# Patient Record
Sex: FEMALE | Race: WHITE
[De-identification: names, ages, dates, MRNs, and addresses within clinical notes are randomized per-mention and may not be internally consistent; named-entity substitution may affect disease eponyms.]

---

## 2019-12-13 ENCOUNTER — HOSPITAL ENCOUNTER (OUTPATIENT)
Dept: HOSPITAL 39 - MAMMO | Age: 44
End: 2019-12-13
Attending: FAMILY MEDICINE
Payer: COMMERCIAL

## 2019-12-13 DIAGNOSIS — Z12.31: Primary | ICD-10-CM

## 2020-01-02 ENCOUNTER — HOSPITAL ENCOUNTER (OUTPATIENT)
Dept: HOSPITAL 39 - MAMMO | Age: 45
End: 2020-01-02
Attending: FAMILY MEDICINE
Payer: COMMERCIAL

## 2020-01-02 DIAGNOSIS — N60.01: Primary | ICD-10-CM

## 2020-01-02 DIAGNOSIS — N60.02: ICD-10-CM

## 2020-01-02 PROCEDURE — 77066 DX MAMMO INCL CAD BI: CPT

## 2020-01-02 PROCEDURE — 76641 ULTRASOUND BREAST COMPLETE: CPT

## 2020-01-02 NOTE — US
EXAM DESCRIPTION: 

3D Diagnostic, Bilateral (accession [id number]),

Breast,Bilateral (accession [id number]): Ultrasound



CLINICAL HISTORY: 

44 yearsFemaleABNORMAL MAMMO bilateral breast masses on

screening. Lifetime risk of developing breast cancer

(Tyrer-Cuzick model)(%):  8.7.



COMPARISON: 

Bilateral screening digital breast tomosynthesis 13 December 2019.



TECHNIQUE: 

Bilateral LM projection full-field images, digital tomosynthesis

technique, with Gabby implant displacement. Bilateral 2-D

digital full-field images:  LM projection with no implant

displacement. CAD not available.. Transcutaneous scanning of the

bilateral breasts utilizing gray-scale and Doppler modes.

Scanning performed by the sonographer ; observation by Dr. Hassan.



FINDINGS: 

The breast parenchymal density pattern is: Heterogeneously dense

breast tissue, which may obscure small masses. Extremely dense

breast tissue, which lowers the sensitivity of mammography. No

skin thickening or nipple retraction . Left breast mass density

at the 8:00-9:00 position posterior lower inner quadrant left

breast abutting the implant capsule. No associated

microcalcifications. Smaller mass density in the upper outer

quadrant of the left breast middle third at the 2:00-2:30

position. No associated microcalcifications mass density in the

upper inner quadrant of the middle third of the right breast

approximately 2:00-3:00 position. Mass density posterior third of

the right breast 5:00-6:00 position. Not associated with

microcalcifications. Bilateral retro-muscular implants..



Ultrasound: Scanning of the lateral medial and upper outer

quadrant of the right breast. Mixture of fibroglandular tissues

and fatty tissues. Circumscribed object with echogenic margins,

wider than tall orientation, predominantly anechoic features, and

posterior acoustic and enhancement are visualized. One object

measures 4 x 4 x 3 mm. Another object measures 5.4 x 7.6 x 5.3

mm. These are nonvascular and are most likely cysts. Bilobed

object measures 9.6 x 9.0 x 2.2 mm but may contain internal

echoes and vascularity suggesting a lymph node. No dominant solid

mass and no large calcifications. Capsule appears intact where

seen.

Scanning of the left breast demonstrates a mixture of

fibroglandular and fatty tissues. At the 8:00 position in the

posterior third of the breast is a 10.1 x 11.2 x 7.7 mm anechoic

structure with circumscribed minimally lobulated margins, wider

than tall orientation, and no vascularity and posterior acoustic

enhancement consistent with a cyst. At the 2:00 position

posteriorly, is a anechoic structure measuring 4 x 4 by 3 mm with

no internal vascularity, circumscribed margins partially

echogenic, and posterior acoustic enhancement consistent with a

cyst.



IMPRESSION: 

Benign exam. Bilateral cysts and lymph nodes. Implant capsules

appear intact where seen.



BIRAD CATEGORY: 2 BENIGN FINDINGS.



RECOMMENDATIONS:

FOLLOW UP: Return to routine digital bilateral  mammographic

screening, one year interval from  December 2019.



Written communication explaining the IMPRESSION and follow-up,

will be mailed to the patient and referring health care provider.

The FINDINGS  and the FOLLOW-UP plan were reviewed in person with

the patient after the examination.



According to the [employer], yearly mammograms

are recommended starting at age 40 and continuing as long as a

woman is in good health.  Any breast change noted on a breast

self-exam should be reported promptly to the patient's healthcare

provider.  Breast MRI is recommended for women with an

approximately 20-25% or greater lifetime risk of breast cancer,

including women with a strong family history of breast or ovarian

cancer and women who have been treated for Hodgkin's disease.



A negative mammographic report should not delay tissue diagnosis

in patients with significant clinical history or physical

findings.



Extremely dense breast tissue limits the sensitivity of digital

mammography. 



Electronically signed by:  Roman Hassan MD  1/2/2020 11:33 AM CST

Workstation: [id number]

## 2020-01-02 NOTE — MAM
EXAM DESCRIPTION: 

3D Diagnostic, Bilateral (accession [id number]),

Breast,Bilateral (accession [id number]): Ultrasound



CLINICAL HISTORY: 

44 yearsFemaleABNORMAL MAMMO bilateral breast masses on

screening. Lifetime risk of developing breast cancer

(Tyrer-Cuzick model)(%):  8.7.



COMPARISON: 

Bilateral screening digital breast tomosynthesis 13 December 2019.



TECHNIQUE: 

Bilateral LM projection full-field images, digital tomosynthesis

technique, with Gabby implant displacement. Bilateral 2-D

digital full-field images:  LM projection with no implant

displacement. CAD not available.. Transcutaneous scanning of the

bilateral breasts utilizing gray-scale and Doppler modes.

Scanning performed by the sonographer ; observation by Dr. Hassan.



FINDINGS: 

The breast parenchymal density pattern is: Heterogeneously dense

breast tissue, which may obscure small masses. Extremely dense

breast tissue, which lowers the sensitivity of mammography. No

skin thickening or nipple retraction . Left breast mass density

at the 8:00-9:00 position posterior lower inner quadrant left

breast abutting the implant capsule. No associated

microcalcifications. Smaller mass density in the upper outer

quadrant of the left breast middle third at the 2:00-2:30

position. No associated microcalcifications mass density in the

upper inner quadrant of the middle third of the right breast

approximately 2:00-3:00 position. Mass density posterior third of

the right breast 5:00-6:00 position. Not associated with

microcalcifications. Bilateral retro-muscular implants..



Ultrasound: Scanning of the lateral medial and upper outer

quadrant of the right breast. Mixture of fibroglandular tissues

and fatty tissues. Circumscribed object with echogenic margins,

wider than tall orientation, predominantly anechoic features, and

posterior acoustic and enhancement are visualized. One object

measures 4 x 4 x 3 mm. Another object measures 5.4 x 7.6 x 5.3

mm. These are nonvascular and are most likely cysts. Bilobed

object measures 9.6 x 9.0 x 2.2 mm but may contain internal

echoes and vascularity suggesting a lymph node. No dominant solid

mass and no large calcifications. Capsule appears intact where

seen.

Scanning of the left breast demonstrates a mixture of

fibroglandular and fatty tissues. At the 8:00 position in the

posterior third of the breast is a 10.1 x 11.2 x 7.7 mm anechoic

structure with circumscribed minimally lobulated margins, wider

than tall orientation, and no vascularity and posterior acoustic

enhancement consistent with a cyst. At the 2:00 position

posteriorly, is a anechoic structure measuring 4 x 4 by 3 mm with

no internal vascularity, circumscribed margins partially

echogenic, and posterior acoustic enhancement consistent with a

cyst.



IMPRESSION: 

Benign exam. Bilateral cysts and lymph nodes. Implant capsules

appear intact where seen.



BIRAD CATEGORY: 2 BENIGN FINDINGS.



RECOMMENDATIONS:

FOLLOW UP: Return to routine digital bilateral  mammographic

screening, one year interval from  December 2019.



Written communication explaining the IMPRESSION and follow-up,

will be mailed to the patient and referring health care provider.

The FINDINGS  and the FOLLOW-UP plan were reviewed in person with

the patient after the examination.



According to the [employer], yearly mammograms

are recommended starting at age 40 and continuing as long as a

woman is in good health.  Any breast change noted on a breast

self-exam should be reported promptly to the patient's healthcare

provider.  Breast MRI is recommended for women with an

approximately 20-25% or greater lifetime risk of breast cancer,

including women with a strong family history of breast or ovarian

cancer and women who have been treated for Hodgkin's disease.



A negative mammographic report should not delay tissue diagnosis

in patients with significant clinical history or physical

findings.



Extremely dense breast tissue limits the sensitivity of digital

mammography. 



Electronically signed by:  Roman Hassan MD  1/2/2020 11:33 AM CST

Workstation: [id number]

## 2021-02-03 ENCOUNTER — HOSPITAL ENCOUNTER (OUTPATIENT)
Dept: HOSPITAL 39 - MAMMO | Age: 46
End: 2021-02-03
Attending: FAMILY MEDICINE
Payer: SELF-PAY

## 2021-02-03 DIAGNOSIS — Z12.31: Primary | ICD-10-CM

## 2021-02-03 NOTE — MAM
EXAM DESCRIPTION: 

3D Screening BILATERAL : Digital Mammography.



CLINICAL HISTORY: 

45 years Female ANNUAL SCREENING . No complaints. Bilateral

breast augmentation. No family history of breast cancer. Menarche

age 13. Childbirth age 21. Menopause age 32. Currently on HRT..

Lifetime risk of developing breast cancer (Tyrer-Cuzick

model)(%):  8.6.



COMPARISON: 

Bilateral screening digital breast tomosynthesis with 2-D imaging

and Gabby implant displacement technique December 2019 and

diagnostic imaging on January 2020    



TECHNIQUE: 

Bilateral CC and MLO projection full-field images, with Gabby

Implant Displacement digital tomosynthesis mammographic

technique. Bilateral 2-D digital full-field images, MLO and CC

projections, non-displaced. Bilateral digital 2-D full-field MLO

images.  MLO projections.  CAD available for 2-D images.  



FINDINGS: 

The breast parenchymal density pattern is: Heterogeneously dense

breast tissue, which may obscure small masses.  Bilateral

retro-muscular implants. Capsules appear intact where seen with

no significant increase in folds. Axillary nodes. Stable

intramammary nodules right breast.  No skin thickening or nipple

retraction

No new focal, stellate mass or density, focal asymmetry , and no

suspicious microcalcifications bilaterally. Stable mammograms

compared to prior study. 



IMPRESSION: 

Benign exam.



BIRAD CATEGORY: 2 BENIGN FINDINGS.



RECOMMENDATIONS:

FOLLOW UP: Routine digital bilateral  mammographic screening, one

year interval from  February 2021.



Written communication explaining the IMPRESSION and follow-up,

will be mailed to the patient and referring health care provider.



According to the [employer], yearly mammograms

are recommended starting at age 40 and continuing as long as a

woman is in good health.  Any breast change noted on a breast

self-exam should be reported promptly to the patient's healthcare

provider.  Breast MRI is recommended for women with an

approximately 20-25% or greater lifetime risk of breast cancer,

including women with a strong family history of breast or ovarian

cancer and women who have been treated for Hodgkin's disease.  A

negative mammographic report should not delay tissue diagnosis in

patients with significant clinical history or physical findings. 

Extremely dense breast tissue limits the sensitivity of digital

mammography. 





Electronically signed by:  Roman Hassan MD  2/3/2021 8:45 PM CST

Workstation: [id number]

## 2021-03-03 NOTE — MAM
EXAM DESCRIPTION: 

3D Screening BILATERAL : Digital Mammography.



CLINICAL HISTORY: 

44 years Female [employer] [employer] . No complaints. No personal or

family history of breast cancer. Menarche age 13. Childbirth age

21. Menopausal age 38. No HRT. Bilateral breast augmentation. 

Lifetime risk of developing breast cancer (Tyrer-Cuzick

model)(%):  8.7.



COMPARISON: 

Baseline study at this facility.  No prior reports available.  



TECHNIQUE: 

Bilateral CC and MLO projection full-field images, with Gabby

Implant Displacement, digital tomosynthesis mammographic

technique. Bilateral 2-D digital full-field images, MLO and CC

projections, non-displaced. Bilateral digital 2-D full-field MLO

images.  CAD not available for tomosynthesis or 2-D images.  



FINDINGS: 

The breast parenchymal density pattern is: Heterogeneously dense

breast tissue, which may obscure small masses. No skin thickening

or nipple retraction.  Mass density in the middle third of the

upper outer quadrant right breast at the 10:00 position, less

than 1 cm diameter with mostly circumscribed margins and no

calcifications. Other more lobulated appearing mass densities in

the posterior right breast seen on the MLO view only, in the

vicinity of the posterior nipple line. Almost 2 cm diameter

mostly circumscribed mass density in the lower inner quadrant of

the left breast posteriorly at 8:30. These are most likely lymph

nodes. Bilateral implants show minimal lobulation possibly

internal capsular separation. Silicone type and retro-muscular

position.

no suspicious microcalcifications bilaterally. 



IMPRESSION: 

BI-RADS CATEGORY: 0 - INCOMPLETE- Need additional imaging

evaluation.

FOLLOW-UP: Recall for additional imaging: Bilateral LM projection

full-field 2-D and synthesis images without and with implant

displacement. Bilateral directed ultrasound of the regions of

interest..



Written communication concerning the IMPRESSION and Follow-up,

will be mailed to the patient and referring health care provider.



Electronically signed by:  Roman Hassan MD  12/17/2019 4:13 PM

CST Workstation: [id number] Detail Level: Detailed